# Patient Record
Sex: MALE | Race: WHITE | NOT HISPANIC OR LATINO | URBAN - METROPOLITAN AREA
[De-identification: names, ages, dates, MRNs, and addresses within clinical notes are randomized per-mention and may not be internally consistent; named-entity substitution may affect disease eponyms.]

---

## 2020-02-09 ENCOUNTER — EMERGENCY (EMERGENCY)
Facility: HOSPITAL | Age: 30
LOS: 1 days | Discharge: HOME | End: 2020-02-09
Admitting: EMERGENCY MEDICINE
Payer: COMMERCIAL

## 2020-02-09 VITALS
SYSTOLIC BLOOD PRESSURE: 133 MMHG | RESPIRATION RATE: 16 BRPM | HEART RATE: 90 BPM | DIASTOLIC BLOOD PRESSURE: 80 MMHG | OXYGEN SATURATION: 93 %

## 2020-02-09 VITALS — HEIGHT: 69 IN | WEIGHT: 199.96 LBS

## 2020-02-09 PROCEDURE — 70450 CT HEAD/BRAIN W/O DYE: CPT | Mod: 26

## 2020-02-09 PROCEDURE — 70486 CT MAXILLOFACIAL W/O DYE: CPT | Mod: 26

## 2020-02-09 PROCEDURE — 99285 EMERGENCY DEPT VISIT HI MDM: CPT

## 2020-02-09 RX ORDER — TETANUS TOXOID, REDUCED DIPHTHERIA TOXOID AND ACELLULAR PERTUSSIS VACCINE, ADSORBED 5; 2.5; 8; 8; 2.5 [IU]/.5ML; [IU]/.5ML; UG/.5ML; UG/.5ML; UG/.5ML
0.5 SUSPENSION INTRAMUSCULAR ONCE
Refills: 0 | Status: COMPLETED | OUTPATIENT
Start: 2020-02-09 | End: 2020-02-09

## 2020-02-09 RX ORDER — IBUPROFEN 200 MG
600 TABLET ORAL ONCE
Refills: 0 | Status: DISCONTINUED | OUTPATIENT
Start: 2020-02-09 | End: 2020-02-09

## 2020-02-09 RX ADMIN — TETANUS TOXOID, REDUCED DIPHTHERIA TOXOID AND ACELLULAR PERTUSSIS VACCINE, ADSORBED 0.5 MILLILITER(S): 5; 2.5; 8; 8; 2.5 SUSPENSION INTRAMUSCULAR at 03:31

## 2020-02-09 NOTE — ED ADULT NURSE NOTE - SUICIDE SCREENING QUESTION 3
POST-OP DIAGNOSIS:  Impingement syndrome of shoulder 21-Aug-2019 16:11:10  Naren Whipple  Tear of biceps tendon 21-Aug-2019 16:11:08  Naren Whipple  Acromioclavicular arthrosis 21-Aug-2019 16:10:50  Naren Whipple  Partial tear subscapularis tendon 21-Aug-2019 16:09:58  Naren Whipple
Patient unable to complete

## 2020-02-09 NOTE — ED ADULT NURSE NOTE - NSIMPLEMENTINTERV_GEN_ALL_ED
Implemented All Fall Risk Interventions:  [place] to call system. Call bell, personal items and telephone within reach. Instruct patient to call for assistance. Room bathroom lighting operational. Non-slip footwear when patient is off stretcher. Physically safe environment: no spills, clutter or unnecessary equipment. Stretcher in lowest position, wheels locked, appropriate side rails in place. Provide visual cue, wrist band, yellow gown, etc. Monitor gait and stability. Monitor for mental status changes and reorient to person, place, and time. Review medications for side effects contributing to fall risk. Reinforce activity limits and safety measures with patient and family.

## 2020-02-09 NOTE — ED PROVIDER NOTE - CLINICAL SUMMARY MEDICAL DECISION MAKING FREE TEXT BOX
pt here for public intox, euglycemic, monitored in the ED with improved mental status, CTH and MF with no acute fx or ICH, AFVSS, currently ambulatory with steady gait, tolerating PO without N/V, clear speech, without additional medical complaints noted.  Repeat exam and neuro/cranial nerve exams wnl.  No evidence of head/neck trauma. Pt feels much better and safe for discharge. Counseling provided. Medically stable for d/c.
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
## 2020-02-09 NOTE — ED PROVIDER NOTE - PHYSICAL EXAMINATION
Gen - WDWN M, +AOB, no acute distress  Skin - warm, dry, abrasion to the forehead region with moderate edema, no laceration   HEENT - NC, +frontal scalp hematoma and TTP, no crepitus noted, PERRL, mild conjunctival injection, pupils 3mm b/l, TM intact with no hemotympanium b/l, no facial contusion or periorbital ecchymosis, o/p clear, uvula midline, airway patent, neck supple with no step off or midline tenderness, FROM   CV - S1S2, R/R/R  Resp - respiration non-labored, CTAB, symmetric bs b/l, no r/r/w  GI - NABS, soft, ND, NT, no rebound or guarding, no CVAT b/l  MS - w/w/p, no c/c/e, calves supple and NT  Neuro - Alert and awake, slurred speech, unsteady gait

## 2020-02-09 NOTE — ED PROVIDER NOTE - CARE PLAN
Principal Discharge DX:	Altered mental status associated with intoxication  Secondary Diagnosis:	Scalp contusion  Secondary Diagnosis:	Fall

## 2020-02-09 NOTE — ED ADULT NURSE REASSESSMENT NOTE - NS ED NURSE REASSESS COMMENT FT1
Pt able to walk with steady gait and able to walk to the bathroom. Pt unable to provide information about where he lives and how he will be getting home. Pt assisted back to his bed and explained that we will check on him again at a later time before we send him home. Pt verbalized understanding.

## 2020-02-09 NOTE — ED PROVIDER NOTE - PATIENT PORTAL LINK FT
You can access the FollowMyHealth Patient Portal offered by [facility] by registering at the following website: [email or web address]. By joining [employer]’s FollowMyHealth portal, you will also be able to view your health information using other applications (apps) compatible with our system.

## 2020-02-15 DIAGNOSIS — R41.82 ALTERED MENTAL STATUS, UNSPECIFIED: ICD-10-CM

## 2020-02-15 DIAGNOSIS — S00.03XA CONTUSION OF SCALP, INITIAL ENCOUNTER: ICD-10-CM
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
## 2020-02-15 DIAGNOSIS — F10.129 ALCOHOL ABUSE WITH INTOXICATION, UNSPECIFIED: ICD-10-CM
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
## 2022-04-20 NOTE — ED PROVIDER NOTE - CROS ED CONS ALL NEG
Patient is a 65y old  Male who presents with a chief complaint of hyperglycemia (2022 09:20)        Over Night Events:  Looks better.  Feels better.  Still slightly confused.  Off pressors.  SP CTH         ROS:     All ROS are negative except HPI         PHYSICAL EXAM    ICU Vital Signs Last 24 Hrs  T(C): 37.2 (2022 08:00), Max: 39.3 (2022 18:00)  T(F): 99 (2022 08:00), Max: 102.8 (2022 18:00)  HR: 94 (2022 08:00) (94 - 132)  BP: 118/71 (2022 08:00) (118/71 - 183/91)  BP(mean): 83 (2022 08:00) (80 - 124)  ABP: --  ABP(mean): --  RR: 22 (2022 08:00) (13 - 29)  SpO2: 98% (2022 08:00) (89% - 98%)      CONSTITUTIONAL:  Well nourished.  In NAD    ENT:   Airway patent,   Mouth with normal mucosa.   No thrush    EYES:   Pupils equal,   Round and reactive to light.    CARDIAC:   Normal rate,   Regular rhythm.    No edema      Vascular:  Normal systolic impulse  No Carotid bruits    RESPIRATORY:   No wheezing  Bilateral BS  Normal chest expansion  Not tachypneic,  No use of accessory muscles    GASTROINTESTINAL:  Abdomen soft,   Non-tender,   No guarding,   + BS    MUSCULOSKELETAL:   Range of motion is not limited,  No clubbing, cyanosis    NEUROLOGICAL:   Alert and oriented X 3   No motor  deficits.    SKIN:   Skin normal color for race,   Warm and dry and intact.   No evidence of rash.    PSYCHIATRIC:   No apparent risk to self or others.    HEMATOLOGICAL:  No cervical  lymphadenopathy.  no inguinal lymphadenopathy      22 @ 07:01  -  22 @ 07:00  --------------------------------------------------------  IN:    IV PiggyBack: 950 mL    Lactated Ringers: 200 mL  Total IN: 1150 mL    OUT:  Total OUT: 0 mL    Total NET: 1150 mL          LABS:                            11.2   10.47 )-----------( 195      ( 2022 05:30 )             31.7                                               04-20    135  |  93<L>  |  16  ----------------------------<  281<H>  3.5   |  24  |  1.2    Ca    8.6      2022 05:30  Phos  2.1     04-19  Mg     1.6     04-20    TPro  6.5  /  Alb  3.8  /  TBili  0.8  /  DBili  x   /  AST  18  /  ALT  17  /  AlkPhos  72  04-20      PT/INR - ( 2022 15:27 )   PT: 15.60 sec;   INR: 1.36 ratio         PTT - ( 2022 15:27 )  PTT:29.6 sec                                       Urinalysis Basic - ( 2022 16:30 )    Color: Yellow / Appearance: Clear / S.021 / pH: x  Gluc: x / Ketone: Small  / Bili: Negative / Urobili: 3 mg/dL   Blood: x / Protein: 100 mg/dL / Nitrite: Negative   Leuk Esterase: Negative / RBC: 5 /HPF / WBC 1 /HPF   Sq Epi: x / Non Sq Epi: 5 /HPF / Bacteria: Negative        CARDIAC MARKERS ( 2022 21:15 )  x     / <0.01 ng/mL / x     / x     / x      CARDIAC MARKERS ( 2022 19:06 )  x     / tnp ng/mL / x     / x     / x      CARDIAC MARKERS ( 2022 11:00 )  x     / x     / 112 U/L / x     / x                                                LIVER FUNCTIONS - ( 2022 05:30 )  Alb: 3.8 g/dL / Pro: 6.5 g/dL / ALK PHOS: 72 U/L / ALT: 17 U/L / AST: 18 U/L / GGT: x                                                                                                                                   ABG - ( 2022 02:04 )  pH, Arterial: 7.52  pH, Blood: x     /  pCO2: 35    /  pO2: 91    / HCO3: 29    / Base Excess: 5.7   /  SaO2: 99.1                MEDICATIONS  (STANDING):  acyclovir IVPB      acyclovir IVPB 1050 milliGRAM(s) IV Intermittent every 8 hours  amLODIPine   Tablet 10 milliGRAM(s) Oral daily  ampicillin  IVPB 1 Gram(s) IV Intermittent every 6 hours  ampicillin  IVPB      atorvastatin 40 milliGRAM(s) Oral at bedtime  cefTRIAXone   IVPB 2000 milliGRAM(s) IV Intermittent every 12 hours  chlorhexidine 4% Liquid 1 Application(s) Topical <User Schedule>  dextrose 5%. 1000 milliLiter(s) (50 mL/Hr) IV Continuous <Continuous>  dextrose 5%. 1000 milliLiter(s) (100 mL/Hr) IV Continuous <Continuous>  dextrose 50% Injectable 25 Gram(s) IV Push once  dextrose 50% Injectable 12.5 Gram(s) IV Push once  dextrose 50% Injectable 25 Gram(s) IV Push once  enoxaparin Injectable 40 milliGRAM(s) SubCutaneous every 24 hours  furosemide   Injectable 40 milliGRAM(s) IV Push daily  glucagon  Injectable 1 milliGRAM(s) IntraMuscular once  insulin glargine Injectable (LANTUS) 15 Unit(s) SubCutaneous at bedtime  insulin lispro (ADMELOG) corrective regimen sliding scale   SubCutaneous three times a day before meals  insulin lispro Injectable (ADMELOG) 6 Unit(s) SubCutaneous Before meals and at bedtime  lisinopril 40 milliGRAM(s) Oral daily  metoprolol succinate ER 50 milliGRAM(s) Oral daily  pantoprazole    Tablet 40 milliGRAM(s) Oral before breakfast  PARoxetine 30 milliGRAM(s) Oral daily  vancomycin  IVPB 1500 milliGRAM(s) IV Intermittent every 12 hours    MEDICATIONS  (PRN):  acetaminophen     Tablet .. 650 milliGRAM(s) Oral every 6 hours PRN Temp greater or equal to 38C (100.4F), Mild Pain (1 - 3)  acetaminophen  Suppository .. 650 milliGRAM(s) Rectal every 6 hours PRN Temp greater or equal to 38C (100.4F), Mild Pain (1 - 3)  aluminum hydroxide/magnesium hydroxide/simethicone Suspension 30 milliLiter(s) Oral every 4 hours PRN Dyspepsia  dextrose Oral Gel 15 Gram(s) Oral once PRN Blood Glucose LESS THAN 70 milliGRAM(s)/deciliter  ondansetron Injectable 4 milliGRAM(s) IV Push every 8 hours PRN Nausea and/or Vomiting      New X-rays reviewed:                                                                                  ECHO    CXR interpreted by me:       negative...

## 2022-10-31 ENCOUNTER — HOSPITAL ENCOUNTER (EMERGENCY)
Dept: HOSPITAL 74 - JER | Age: 32
Discharge: HOME | End: 2022-10-31
Payer: COMMERCIAL

## 2022-10-31 VITALS
HEART RATE: 89 BPM | RESPIRATION RATE: 18 BRPM | DIASTOLIC BLOOD PRESSURE: 80 MMHG | SYSTOLIC BLOOD PRESSURE: 128 MMHG | TEMPERATURE: 98.1 F

## 2022-10-31 VITALS — BODY MASS INDEX: 30.1 KG/M2

## 2022-10-31 DIAGNOSIS — S51.011A: Primary | ICD-10-CM
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
## 2022-10-31 PROCEDURE — 0HQEXZZ REPAIR LEFT LOWER ARM SKIN, EXTERNAL APPROACH: ICD-10-PCS

## 2023-01-06 ENCOUNTER — EMERGENCY (EMERGENCY)
Facility: HOSPITAL | Age: 33
LOS: 1 days | Discharge: HOME | End: 2023-01-06
Admitting: STUDENT IN AN ORGANIZED HEALTH CARE EDUCATION/TRAINING PROGRAM
Payer: OTHER MISCELLANEOUS

## 2023-01-06 VITALS
HEIGHT: 71 IN | TEMPERATURE: 98 F | WEIGHT: 199.96 LBS | SYSTOLIC BLOOD PRESSURE: 180 MMHG | DIASTOLIC BLOOD PRESSURE: 106 MMHG | HEART RATE: 108 BPM | RESPIRATION RATE: 18 BRPM | OXYGEN SATURATION: 98 %

## 2023-01-06 PROCEDURE — 99284 EMERGENCY DEPT VISIT MOD MDM: CPT

## 2023-01-06 PROCEDURE — 99053 MED SERV 10PM-8AM 24 HR FAC: CPT
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
## 2023-01-07 PROBLEM — Z78.9 OTHER SPECIFIED HEALTH STATUS: Chronic | Status: ACTIVE | Noted: 2020-02-09

## 2023-01-07 PROCEDURE — 73590 X-RAY EXAM OF LOWER LEG: CPT

## 2023-01-07 PROCEDURE — 73080 X-RAY EXAM OF ELBOW: CPT

## 2023-01-07 PROCEDURE — 99284 EMERGENCY DEPT VISIT MOD MDM: CPT

## 2023-01-07 PROCEDURE — 73110 X-RAY EXAM OF WRIST: CPT | Mod: 26,LT

## 2023-01-07 PROCEDURE — 73110 X-RAY EXAM OF WRIST: CPT

## 2023-01-07 PROCEDURE — 73080 X-RAY EXAM OF ELBOW: CPT | Mod: 26,RT

## 2023-01-07 PROCEDURE — 73590 X-RAY EXAM OF LOWER LEG: CPT | Mod: 26,LT

## 2023-01-07 RX ORDER — CYCLOBENZAPRINE HYDROCHLORIDE 10 MG/1
1 TABLET, FILM COATED ORAL
Qty: 14 | Refills: 0
Start: 2023-01-07
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
## 2023-01-07 NOTE — ED ADULT NURSE NOTE - NSIMPLEMENTINTERV_GEN_ALL_ED
Implemented All Universal Safety Interventions:  [place] to call system. Call bell, personal items and telephone within reach. Instruct patient to call for assistance. Room bathroom lighting operational. Non-slip footwear when patient is off stretcher. Physically safe environment: no spills, clutter or unnecessary equipment. Stretcher in lowest position, wheels locked, appropriate side rails in place.

## 2023-01-07 NOTE — ED PROVIDER NOTE - PATIENT PORTAL LINK FT
You can access the FollowMyHealth Patient Portal offered by [facility] by registering at the following website: [email or web address]. By joining [employer]’s FollowMyHealth portal, you will also be able to view your health information using other applications (apps) compatible with our system.

## 2023-01-07 NOTE — ED PROVIDER NOTE - MUSCULOSKELETAL, MLM
Spine appears normal, no midline tenderness.  right elbow no swelling or deformity, +posterior tenderness, +FROM.  left wrist +mild tenderness to dorsum, no swelling or deformity, +FROM.  left leg +superficial abrasion to anterior shin, no swelling, +mild TTP, DP/PT 2+
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
## 2023-01-09 DIAGNOSIS — M25.521 PAIN IN RIGHT ELBOW: ICD-10-CM
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
## 2023-01-09 DIAGNOSIS — M25.532 PAIN IN LEFT WRIST: ICD-10-CM

## 2023-01-09 DIAGNOSIS — S80.812A ABRASION, LEFT LOWER LEG, INITIAL ENCOUNTER: ICD-10-CM

## 2023-01-09 DIAGNOSIS — Z88.0 ALLERGY STATUS TO PENICILLIN: ICD-10-CM

## 2023-06-06 NOTE — ED ADULT NURSE NOTE - CHPI ED NUR SYMPTOMS NEG
,
no acting out behaviors/no back pain/no bruising/no crying/no decreased eating/drinking/no disorientation/no dizziness/no fussiness/no headache/no laceration/no loss of consciousness/no neck tenderness/no sleeping issues

## 2024-12-10 NOTE — ED ADULT NURSE NOTE - NSSUHOSCREENINGYN_ED_ALL_ED
"        Occupational Therapy  Occupational Therapy Orthopedic Evaluation    Patient Name: Austen Eagle \"Bernardo\"  MRN: [id number]  Today's Date: 12/10/2024  Time Calculation  Start Time: 0835  Stop Time: 0910  Time Calculation (min): 35 min  OT Evaluation Time Entry  OT Evaluation (Low) Time Entry: 30,  ,      Insurance:  Visit number: 1   Authorization info: no auth   Insurance Type: Medicare     General:  Reason for visit: B hand pain   Referred by: Dr Mckeon     Current Problem  Problem List Items Addressed This Visit             ICD-10-CM    Bilateral hand pain - Primary M79.641, M79.642    Relevant Orders    Follow Up In Occupational Therapy       Precautions: none           Medical History Form: Reviewed (scanned into chart)    Subjective:   Chief Complaint: B hand pain, decreased  strength from OA and falling onto hands   Onset: 10/2024  LELE: Chronic         Hand Dominance: Right    Current Condition since injury:   same     PAIN     Location: B digits   Description: sharp  4/10 B  Aggravating Factors: Gripping/pinching, Opening jars/containers, and Lifting/Carrying   Relieving Factors: voltaren gel      Relevant Information (PMH & Previous Tests/Imaging): XR  Previous Interventions/Treatments: None     Prior Level of Function (PLOF)  Exercise/Physical Activity: minimal   Work/School: retired   Current ADL/IADL Status: Independent      Patients Living Environment: Reviewed and no concern    Primary Language: English    Pt goals for therapy: decreased pain     Red Flags: Do you have any of the following? No  Fever/chills, unexplained weight changes, dizziness/fainting, unexplained change in bowel or bladder functions, unexplained malaise or muscle weakness, night pain/sweats, numbness or tingling    Objective:    Right Hand AROM (degrees)   MCP PIP DIP DPC   IF     4   MF    2   RF    2   SF    2   Thumb       Thumb RABD       Thumb PABD         Left Hand AROM (degrees)   MCP PIP DIP DPC   IF     2   MF    2 "   RF    2   SF    2   Thumb       Thumb RABD       Thumb PABD          Hand Strength Measures (lbs)   R L   Dynamometer  60 (90 lbs) 40 (76 lbs)    Lateral Pinch     3jaw Pinch          Special Tests  Carpals  Rodriguez's test:   Lunotriquetral Shuck/Shear:   Midcarpal Shift Test:   DRUJ Instability/Piano Key:     Wrist Tendon   Finkelstein's(DeQuervain's):   CTS  Carpal Compression:   Phalen:   Reverse Phalen:   Tinels at Carpal tunnel:   TFCC   Ulnar Fovea Sign:   TFCC Load Test:   Supination Lift Test:     Finger/Thumb  CMC Grind: + B  Froment's Sign:   Thumb UCL:   Renetta's Sign:   Wartenburg's Sign:   ORL Tightness:   Intrinsic Tightness: + B   Extrinsic Tightness:   Arturo's Test:   Alonzo's Test:     AIN Weakness: neg  PIN Weakness: neg    Physical Observation: Severe arthritis present R index PIPJ, B enlarged 1st CMC joints   Edema: none    Sensory: wnl  Numbness/Tingling: none        Outcome Measures:  DASH: 38/80    EDUCATION: home exercise program, plan of care, activity modifications, pain management, and injury pathology, comfort cool orthosis      Access Code: [email or web address]  URL: [email or web address]  Date: 12/10/2024  Prepared by: Jovany Youngblood    Exercises  - Seated Single Digit Intrinsic Stretch  - 1 x daily - 7 x weekly - 1 sets - 1 reps - 10 hold  - Finger Spreading  - 1 x daily - 7 x weekly - 3 sets - 10 reps  - Putty Squeezes  - 1 x daily - 7 x weekly - 3 sets - 10 reps  - Finger Extension with Putty  - 1 x daily - 7 x weekly - 3 sets - 10 reps  - Seated Wrist Flexor Hook Fist Tendon Gliding  - 1 x daily - 7 x weekly - 3 sets - 10 reps  - Seated Straight Fist AROM  - 1 x daily - 7 x weekly - 3 sets - 10 reps  - Rolling Putty on Table  - 1 x daily - 7 x weekly - 3 sets - 10 reps  - Seated Wrist Flexor Full Fist Tendon Gliding  - 1 x daily - 7 x weekly - 3 sets - 10 reps  - Standing Wrist Extension Stretch  - 1 x daily - 7 x weekly - 1 sets - 2 reps - 30 hold  Goals:  1. Pt will  be independent with HEP to support progress  2. Pt will have no more than 1/10 B hand pain with ADL's   3. Pt will increase B  strength by 20 lbs to improve  and ability to open jars  4. Pt will increase score on UEFI by 20 pts  5. Pt will be within 1 cm from DPC B digits to improve  during ADL's    Plan of care was developed with input and agreement by the patient    Treatments:     Modalities:       min       Therapeutic Exercise:    min       Manual Therapy:      min       Therapeutic Activity:     min       Neuromuscular Re-education:   min       Orthosis:      5 min  Large comfort cool orthosis for R     Wound Care:      min      Self Care:       min      Other Treatment:     min      Assessment: Patient is a 63 yo RHD male presenting with B chronic hand/thumb pain and OA. Pain intensified after  he fell off a ladder in the fall  resulting in limited participation in pain-free ADLs and inability to perform at their prior level of function. Pt would benefit from occupational therapy to address the impairments found & listed previously in the objective section in order to return to safe and pain-free ADLs and prior level of function.       Clinical Presentation: Evolving with changing characteristics  Personal Factors Impacting Care: None    Plan:      Planned Interventions include: therapeutic exercise, therapeutic activity, self-care home management, manual therapy, therapeutic activities, gait training, neuromuscular coordination, vasopneumatic, dry needling, aquatic therapy, electric stimulation, fluidotherapy, ultrasound, kinesiotaping, orthosis fabrication, wound care  Frequency: Every Other Week  Duration: 6 Weeks    Ambulatory Screening Summary:      Screening  Frequency  Date Last Completed   Spiritual and Cultural Beliefs   Screening  each visit or episode of care 11/8/2024   Falls Risk Screening  every ambulatory visit    Pain Screening  annually at primary care visit  11/8/2024   Domestic  Violence screening  annually at primary care visit 11/8/2024   Elder Abuse Screening  annually at primary care visit    Depression Screening  annually in the primary care setting 11/8/2024   Suicide Risk Screening  annually in the primary care setting    Nutrition and Food Insecurity   Screening  at least annually at primary care visit     Key Learner  annually in the primary care setting 11/8/2024   Drug Screen  11/8/2024  2:46 PM   Alcohol Screen  11/8/2024  2:46 PM   Advance Directive  11/8/2024         Jovany Youngblood, OT, CHT   Yes - the patient is able to be screened
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
